# Patient Record
Sex: FEMALE | Employment: OTHER | URBAN - METROPOLITAN AREA
[De-identification: names, ages, dates, MRNs, and addresses within clinical notes are randomized per-mention and may not be internally consistent; named-entity substitution may affect disease eponyms.]

---

## 2019-04-30 ENCOUNTER — CONSULTATION (OUTPATIENT)
Dept: URBAN - METROPOLITAN AREA CLINIC 11 | Facility: CLINIC | Age: 69
End: 2019-04-30

## 2019-04-30 VITALS — DIASTOLIC BLOOD PRESSURE: 58 MMHG | SYSTOLIC BLOOD PRESSURE: 102 MMHG | HEIGHT: 55 IN

## 2019-04-30 ASSESSMENT — VISUAL ACUITY
OD_SC: 20/20-2
OS_SC: 20/40
OS_PH: 20/30

## 2019-04-30 ASSESSMENT — TONOMETRY
OD_IOP_MMHG: 17
OS_IOP_MMHG: 17

## 2019-04-30 NOTE — PATIENT DISCUSSION
Greater than 50% of exam spent in face to face dialogue with Ms. Nolan. 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 All questions and concerns addressed at length. She would like to schedule in the near future.